# Patient Record
Sex: MALE | Race: WHITE | NOT HISPANIC OR LATINO | URBAN - METROPOLITAN AREA
[De-identification: names, ages, dates, MRNs, and addresses within clinical notes are randomized per-mention and may not be internally consistent; named-entity substitution may affect disease eponyms.]

---

## 2020-11-04 ENCOUNTER — OFFICE VISIT (OUTPATIENT)
Dept: URBAN - METROPOLITAN AREA CLINIC 82 | Facility: CLINIC | Age: 80
End: 2020-11-04
Payer: MEDICARE

## 2020-11-04 ENCOUNTER — LAB OUTSIDE AN ENCOUNTER (OUTPATIENT)
Dept: URBAN - METROPOLITAN AREA CLINIC 82 | Facility: CLINIC | Age: 80
End: 2020-11-04

## 2020-11-04 ENCOUNTER — WEB ENCOUNTER (OUTPATIENT)
Dept: URBAN - METROPOLITAN AREA CLINIC 82 | Facility: CLINIC | Age: 80
End: 2020-11-04

## 2020-11-04 VITALS
RESPIRATION RATE: 16 BRPM | DIASTOLIC BLOOD PRESSURE: 89 MMHG | BODY MASS INDEX: 16.74 KG/M2 | WEIGHT: 113 LBS | SYSTOLIC BLOOD PRESSURE: 131 MMHG | HEIGHT: 69 IN | TEMPERATURE: 97.6 F | HEART RATE: 65 BPM

## 2020-11-04 DIAGNOSIS — R68.81 EARLY SATIETY: ICD-10-CM

## 2020-11-04 DIAGNOSIS — R63.4 ABNORMAL WEIGHT LOSS: ICD-10-CM

## 2020-11-04 DIAGNOSIS — K59.1 FUNCTIONAL DIARRHEA: ICD-10-CM

## 2020-11-04 DIAGNOSIS — E43 SEVERE PROTEIN-CALORIE MALNUTRITION: ICD-10-CM

## 2020-11-04 PROBLEM — 238107002: Status: ACTIVE | Noted: 2020-11-04

## 2020-11-04 PROCEDURE — 99205 OFFICE O/P NEW HI 60 MIN: CPT | Performed by: INTERNAL MEDICINE

## 2020-11-04 PROCEDURE — G8427 DOCREV CUR MEDS BY ELIG CLIN: HCPCS | Performed by: INTERNAL MEDICINE

## 2020-11-04 PROCEDURE — G9903 PT SCRN TBCO ID AS NON USER: HCPCS | Performed by: INTERNAL MEDICINE

## 2020-11-04 PROCEDURE — G8418 CALC BMI BLW LOW PARAM F/U: HCPCS | Performed by: INTERNAL MEDICINE

## 2020-11-04 PROCEDURE — G8482 FLU IMMUNIZE ORDER/ADMIN: HCPCS | Performed by: INTERNAL MEDICINE

## 2020-11-04 RX ORDER — HYDRALAZINE HYDROCHLORIDE 25 MG/1
1 TABLET WITH FOOD TABLET, FILM COATED ORAL
Status: ACTIVE | COMMUNITY

## 2020-11-04 RX ORDER — ROSUVASTATIN CALCIUM 20 MG/1
1 TABLET TABLET, FILM COATED ORAL ONCE A DAY
Status: ON HOLD | COMMUNITY

## 2020-11-04 RX ORDER — LEVOTHYROXINE SODIUM 0.09 MG/1
1 TABLET IN THE MORNING ON AN EMPTY STOMACH TABLET ORAL ONCE A DAY
Status: ACTIVE | COMMUNITY

## 2020-11-04 RX ORDER — PREDNISOLONE 5 MG/1
1 TABLET IN THE MORNING WITH FOOD OR MILK TABLET ORAL ONCE A DAY
Status: ACTIVE | COMMUNITY

## 2020-11-04 RX ORDER — METOPROLOL SUCCINATE 200 MG/1
1 TABLET TABLET, FILM COATED, EXTENDED RELEASE ORAL ONCE A DAY
Status: ACTIVE | COMMUNITY

## 2020-11-04 RX ORDER — CLONIDINE HYDROCHLORIDE 0.1 MG/1
1 TABLET TABLET ORAL ONCE A DAY
Status: ACTIVE | COMMUNITY

## 2020-11-04 RX ORDER — OLMESARTAN MEDOXOMIL 40 MG/1
1 TABLET TABLET ORAL ONCE A DAY
Status: ACTIVE | COMMUNITY

## 2020-11-04 RX ORDER — WARFARIN SODIUM 2.5 MG/1
1 TABLET TABLET ORAL ONCE A DAY
Status: ACTIVE | COMMUNITY

## 2020-11-04 RX ORDER — ACETAMINOPHEN 325 MG/1
1 TABLET AS NEEDED TABLET, FILM COATED ORAL BID
Status: ACTIVE | COMMUNITY

## 2020-11-04 NOTE — HPI-TODAY'S VISIT:
Mr. Jason is a very pleasant 80-year-old male who is brought in today for abnormal weight loss, postprandial early satiety, and recent diarrhea. He has a complex medical history including prior kidney cancer resulting in an external beam radiation.  Recently he has been complicated by bladder infections and abscesses.  He has had 2 procedural interventions in August and October 2020 as a result.  He now has a urostomy. According to his daughter he has had a very slow weight loss earlier this year however it is accelerated with his recent illnesses.  Since the first of the year he has lost approximately 40 pounds altogether. Patient states that since his hospitalization in October he has had early satiety.  As a result he does not want to eat.  He is consuming 1 or 2 boost per day.  Very little else. He recently developed diarrhea described as a loose very frequent stool that occurred persistently over the course of 1 day and stopped with Imodium.  Did start to return but he took another Imodium and it stopped.

## 2020-11-04 NOTE — PHYSICAL EXAM CONSTITUTIONAL:
well developed, thin, cachectic , in no acute distress , presents in wheelchair, ambulates with assistance, normal communication ability

## 2020-11-04 NOTE — PHYSICAL EXAM GASTROINTESTINAL
Abdomen , urostomy in place, soft, nontender, nondistended , no guarding or rigidity , no masses palpable , normal bowel sounds , Liver and Spleen , no hepatomegaly present , no hepatosplenomegaly , liver nontender , spleen not palpable

## 2020-11-05 ENCOUNTER — TELEPHONE ENCOUNTER (OUTPATIENT)
Dept: URBAN - METROPOLITAN AREA MEDICAL CENTER 24 | Facility: MEDICAL CENTER | Age: 80
End: 2020-11-05

## 2020-11-05 ENCOUNTER — LAB OUTSIDE AN ENCOUNTER (OUTPATIENT)
Dept: URBAN - METROPOLITAN AREA CLINIC 82 | Facility: CLINIC | Age: 80
End: 2020-11-05

## 2020-11-11 ENCOUNTER — TELEPHONE ENCOUNTER (OUTPATIENT)
Dept: URBAN - METROPOLITAN AREA CLINIC 92 | Facility: CLINIC | Age: 80
End: 2020-11-11

## 2020-11-11 ENCOUNTER — LAB OUTSIDE AN ENCOUNTER (OUTPATIENT)
Dept: URBAN - METROPOLITAN AREA CLINIC 115 | Facility: CLINIC | Age: 80
End: 2020-11-11

## 2020-11-11 LAB
CREATININE POC: 1.4
PERFORMING LAB: (no result)

## 2021-01-05 ENCOUNTER — TELEPHONE ENCOUNTER (OUTPATIENT)
Dept: URBAN - METROPOLITAN AREA CLINIC 23 | Facility: CLINIC | Age: 81
End: 2021-01-05

## 2023-07-16 ENCOUNTER — DASHBOARD ENCOUNTERS (OUTPATIENT)
Age: 83
End: 2023-07-16

## 2023-07-17 ENCOUNTER — OFFICE VISIT (OUTPATIENT)
Dept: URBAN - METROPOLITAN AREA CLINIC 98 | Facility: CLINIC | Age: 83
End: 2023-07-17

## 2023-07-17 NOTE — HPI-TODAY'S VISIT:
Seen prev by Dr Ahn  has history of prostate and kidney cancer  has ileoconduit  s/p pacemaker, heart valve replacement  history of osteomyelitis of pelvis